# Patient Record
Sex: FEMALE | Race: BLACK OR AFRICAN AMERICAN | NOT HISPANIC OR LATINO | URBAN - METROPOLITAN AREA
[De-identification: names, ages, dates, MRNs, and addresses within clinical notes are randomized per-mention and may not be internally consistent; named-entity substitution may affect disease eponyms.]

---

## 2022-06-10 ENCOUNTER — EMERGENCY (EMERGENCY)
Facility: HOSPITAL | Age: 57
LOS: 1 days | Discharge: HOME | End: 2022-06-10
Admitting: EMERGENCY MEDICINE
Payer: MEDICAID

## 2022-06-10 VITALS
TEMPERATURE: 100 F | OXYGEN SATURATION: 100 % | HEART RATE: 114 BPM | DIASTOLIC BLOOD PRESSURE: 80 MMHG | SYSTOLIC BLOOD PRESSURE: 137 MMHG | RESPIRATION RATE: 16 BRPM

## 2022-06-10 LAB
BASE EXCESS BLDV CALC-SCNC: 0 MMOL/L — SIGNIFICANT CHANGE UP (ref -2–3)
BASOPHILS # BLD AUTO: 0.06 K/UL — SIGNIFICANT CHANGE UP (ref 0–0.2)
BASOPHILS NFR BLD AUTO: 0.5 % — SIGNIFICANT CHANGE UP (ref 0–2)
BLOOD GAS VENOUS COMPREHENSIVE RESULT: SIGNIFICANT CHANGE UP
CHLORIDE BLDV-SCNC: 102 MMOL/L — SIGNIFICANT CHANGE UP (ref 96–108)
CO2 BLDV-SCNC: 24.9 MMOL/L — SIGNIFICANT CHANGE UP (ref 22–26)
EOSINOPHIL # BLD AUTO: 0.04 K/UL — SIGNIFICANT CHANGE UP (ref 0–0.5)
EOSINOPHIL NFR BLD AUTO: 0.3 % — SIGNIFICANT CHANGE UP (ref 0–6)
GAS PNL BLDV: 135 MMOL/L — LOW (ref 136–145)
GAS PNL BLDV: SIGNIFICANT CHANGE UP
GLUCOSE BLDV-MCNC: 160 MG/DL — HIGH (ref 70–99)
HCO3 BLDV-SCNC: 24 MMOL/L — SIGNIFICANT CHANGE UP (ref 22–29)
HCT VFR BLD CALC: 34.5 % — SIGNIFICANT CHANGE UP (ref 34.5–45)
HCT VFR BLDA CALC: 34 % — LOW (ref 34.5–46.5)
HGB BLD CALC-MCNC: 11.3 G/DL — LOW (ref 11.5–15.5)
HGB BLD-MCNC: 11.3 G/DL — LOW (ref 11.5–15.5)
IANC: 9.36 K/UL — HIGH (ref 1.8–7.4)
IMM GRANULOCYTES NFR BLD AUTO: 0.2 % — SIGNIFICANT CHANGE UP (ref 0–1.5)
LACTATE BLDV-MCNC: 1 MMOL/L — SIGNIFICANT CHANGE UP (ref 0.5–2)
LYMPHOCYTES # BLD AUTO: 1.47 K/UL — SIGNIFICANT CHANGE UP (ref 1–3.3)
LYMPHOCYTES # BLD AUTO: 11.9 % — LOW (ref 13–44)
MCHC RBC-ENTMCNC: 29.7 PG — SIGNIFICANT CHANGE UP (ref 27–34)
MCHC RBC-ENTMCNC: 32.8 GM/DL — SIGNIFICANT CHANGE UP (ref 32–36)
MCV RBC AUTO: 90.8 FL — SIGNIFICANT CHANGE UP (ref 80–100)
MONOCYTES # BLD AUTO: 1.41 K/UL — HIGH (ref 0–0.9)
MONOCYTES NFR BLD AUTO: 11.4 % — SIGNIFICANT CHANGE UP (ref 2–14)
NEUTROPHILS # BLD AUTO: 9.36 K/UL — HIGH (ref 1.8–7.4)
NEUTROPHILS NFR BLD AUTO: 75.7 % — SIGNIFICANT CHANGE UP (ref 43–77)
NRBC # BLD: 0 /100 WBCS — SIGNIFICANT CHANGE UP
NRBC # FLD: 0 K/UL — SIGNIFICANT CHANGE UP
PCO2 BLDV: 35 MMHG — LOW (ref 39–42)
PH BLDV: 7.44 — HIGH (ref 7.32–7.43)
PLATELET # BLD AUTO: 336 K/UL — SIGNIFICANT CHANGE UP (ref 150–400)
PO2 BLDV: 53 MMHG — SIGNIFICANT CHANGE UP
POTASSIUM BLDV-SCNC: 3 MMOL/L — LOW (ref 3.5–5.1)
RBC # BLD: 3.8 M/UL — SIGNIFICANT CHANGE UP (ref 3.8–5.2)
RBC # FLD: 12.6 % — SIGNIFICANT CHANGE UP (ref 10.3–14.5)
SAO2 % BLDV: 84.2 % — SIGNIFICANT CHANGE UP
WBC # BLD: 12.37 K/UL — HIGH (ref 3.8–10.5)
WBC # FLD AUTO: 12.37 K/UL — HIGH (ref 3.8–10.5)

## 2022-06-10 PROCEDURE — 99285 EMERGENCY DEPT VISIT HI MDM: CPT

## 2022-06-10 RX ORDER — SODIUM CHLORIDE 9 MG/ML
1000 INJECTION INTRAMUSCULAR; INTRAVENOUS; SUBCUTANEOUS ONCE
Refills: 0 | Status: COMPLETED | OUTPATIENT
Start: 2022-06-10 | End: 2022-06-10

## 2022-06-10 RX ORDER — KETOROLAC TROMETHAMINE 30 MG/ML
30 SYRINGE (ML) INJECTION ONCE
Refills: 0 | Status: DISCONTINUED | OUTPATIENT
Start: 2022-06-10 | End: 2022-06-10

## 2022-06-10 RX ADMIN — SODIUM CHLORIDE 1000 MILLILITER(S): 9 INJECTION INTRAMUSCULAR; INTRAVENOUS; SUBCUTANEOUS at 23:37

## 2022-06-10 RX ADMIN — Medication 30 MILLIGRAM(S): at 23:12

## 2022-06-10 NOTE — ED PROVIDER NOTE - OBJECTIVE STATEMENT
HPI- Patient is a 57 y.o female with no known PMHx who presents to ED c/o abdominal pain x 3 days. Pt states that for past few days she has had lower abdominal pain radiating into pelvis that feels like a heavy pressure. Also notes abdominal distention. Today she began to have vaginal bleeding but only notices it when she urinates. Also notes some constipation. Only passed a little bit of stool today. No melena/bloody stools. Denies hx of Uterine fibroids or ovarian cysts. Denies fevers, chills, nausea, vomiting, dysuria, diarrhea, cp, sob, or any other complaints. Pt states she is perimenopausal. Unsure of exact LMP but was 3 months ago.

## 2022-06-10 NOTE — ED PROVIDER NOTE - PHYSICAL EXAMINATION
Vital signs reviewed.   CONSTITUTIONAL: Well-appearing; well-nourished; in no apparent distress. Non-toxic appearing.   HEAD: Normocephalic, atraumatic.  EYES: conjunctiva and sclera WNL.  CARD: Normal S1, S2; no murmurs, rubs, or gallops noted.  RESP: Normal chest excursion with respiration; breath sounds clear and equal bilaterally; no wheezes, rhonchi, or rales.  ABD/GI: +distended abdomen, +palpable likely fibrous uterus. NCVAT b/l. + suprapubic ttp. Pelvic exam done with GAUTAM Agustin- + blood in vault. Unable to visualize cervix. No CMT. No masses or lesions noted. No adnexal tenderness.   EXT/MS: moves all extremities  SKIN: Normal for age and race  NEURO: Awake, alert, oriented x 3, no gross deficits.  PSYCH: Normal mood; appropriate affect.

## 2022-06-10 NOTE — ED ADULT TRIAGE NOTE - CHIEF COMPLAINT QUOTE
Pt. c/o lower abdominal pain x3 days. Endorses hematuria and constipation. Denies nausea, vomiting, diarrhea, or back pain. Denies past medical history.

## 2022-06-10 NOTE — ED PROVIDER NOTE - PATIENT PORTAL LINK FT
You can access the FollowMyHealth Patient Portal offered by [facility] by registering at the following website: [email or web address]. By joining [facility]’s FollowMyHealth portal, you will also be able to view your health information using other applications (apps) compatible with our system.

## 2022-06-10 NOTE — ED PROVIDER NOTE - PROGRESS NOTE DETAILS
PA Martínez- Pt CT a/p and US reviewed- ?dilated appendix and also very large and multiple fibrous lesions. Surgery and OBGYN consulted. Surgery saw patient-state imaging does not appear concerning for appendicitis. No surgical intervention and agree with OBGYN consult. GYN to see patient. Patient feeling well at this time and sx improved. Will monitor and reassess. NATALEE Martínez- Pt seen by GYN team. No inpatient intervention at this time but they are concerned regarding fibroids and rapid increase in size along with vaginal bleeding given patients age and so feel patient needs quick f/u with GynOnc. # for GynOnc was given by GYN resident to patient and I will include it on DC papers. Results of CT were discussed again with patient along with need for f/u with Gyn Onc to r/o possible malignancy and also urology given CT showing signs of mild/mod hydro. Pt able to urinate in ED w/o difficulty. Pt notes abd pain resolved s/p toradol.  Will f/u urine culture. Vitals are stable. Pt is stable for DC at this point and will follow up with appropriate specialist. Strict return instructions were given. All questions and concerns addressed. No complaints noted. PA Martínez- Pt seen by GYN team. No inpatient intervention at this time but they are concerned regarding fibroids and rapid increase in size along with vaginal bleeding given patients age and so feel patient needs quick f/u with GynOnc. # for GynOnc was given by GYN resident to patient and I will include it on DC papers. Results of CT were discussed again with patient along with need for f/u with Gyn Onc to r/o possible malignancy and also urology given CT showing signs of mild/mod hydro. Pt able to urinate in ED w/o difficulty but will give patient strict return precautions for signs/symptoms of retention (Reviewed imaging/case with Urology PA).  Pt notes abd pain resolved s/p toradol.  Will f/u urine culture. Vitals are stable. Pt is stable for DC at this point and will follow up with appropriate specialist. Strict return instructions were given. All questions and concerns addressed. No complaints noted.

## 2022-06-10 NOTE — ED PROVIDER NOTE - CLINICAL SUMMARY MEDICAL DECISION MAKING FREE TEXT BOX
Patient is a 57 y.o female with no known PMHx who presents to ED c/o abdominal pain x 3 days.     DDx- Pt perimenopausal so could be menses w/ fibroid vs abd mass. Plan for CT a/p r/o bowel obstruction. Will get TVUS to assess vaginal bleeding. Will get labs and ua and give toradol and reassess.

## 2022-06-10 NOTE — ED PROVIDER NOTE - NSFOLLOWUPINSTRUCTIONS_ED_ALL_ED_FT
Patient advised to follow up with GYNECOLOGY/ONCOLOGY and UROLOGY  and told to return to the emergency department immediately for any new or concerning symptoms such as worsening pain, difficulty urinating, fevers, chills, nausea, vomiting, diarrhea, OR ANY OTHER COMPLAINTS. Patient agrees with plan.        Rest, stay hydrated   Can take ibuprofen 600 mg every 6 hours as needed for pain   Follow up with Gyn-oncology # [phone]   Also follow up with urology clinic (call [phone] to make an appointment)        Follow up with your primary care physician in 48-72 hours- bring copies of your results.  Return to the ER for worsening or persistent symptoms, and/or ANY NEW OR CONCERNING SYMPTOMS. If you have issues obtaining follow up, please call: [phone] to obtain a doctor or specialist who takes your insurance in your area.  You may call [phone] to make an appointment with the internal medicine clinic.

## 2022-06-10 NOTE — ED ADULT NURSE NOTE - OBJECTIVE STATEMENT
pt received to intake  , a&ox4 , ambulatory ,  p/w lower abd pain x 3 days and complain of hematuria constipation. Pt breathing even and unlabored on room air. Denies issues urinating , fever, chills, cough, SOB, chest pain, palpitations, dizziness, N/V/D, constipation, numbness, tingling. IV placed. Labs collected and sent. pending US and CT . pt educated on fall precautions and confirms understanding via teach back method. Stretcher locked in lowest position with siderails up x2. Call bell and personal items within reach.

## 2022-06-11 VITALS
TEMPERATURE: 99 F | SYSTOLIC BLOOD PRESSURE: 120 MMHG | HEART RATE: 89 BPM | DIASTOLIC BLOOD PRESSURE: 63 MMHG | RESPIRATION RATE: 15 BRPM | OXYGEN SATURATION: 97 %

## 2022-06-11 LAB
ALBUMIN SERPL ELPH-MCNC: 4 G/DL — SIGNIFICANT CHANGE UP (ref 3.3–5)
ALP SERPL-CCNC: 82 U/L — SIGNIFICANT CHANGE UP (ref 40–120)
ALT FLD-CCNC: 27 U/L — SIGNIFICANT CHANGE UP (ref 4–33)
ANION GAP SERPL CALC-SCNC: 14 MMOL/L — SIGNIFICANT CHANGE UP (ref 7–14)
APPEARANCE UR: CLEAR — SIGNIFICANT CHANGE UP
AST SERPL-CCNC: 25 U/L — SIGNIFICANT CHANGE UP (ref 4–32)
BACTERIA # UR AUTO: NEGATIVE — SIGNIFICANT CHANGE UP
BILIRUB SERPL-MCNC: 0.7 MG/DL — SIGNIFICANT CHANGE UP (ref 0.2–1.2)
BILIRUB UR-MCNC: NEGATIVE — SIGNIFICANT CHANGE UP
BUN SERPL-MCNC: 10 MG/DL — SIGNIFICANT CHANGE UP (ref 7–23)
CALCIUM SERPL-MCNC: 8.8 MG/DL — SIGNIFICANT CHANGE UP (ref 8.4–10.5)
CHLORIDE SERPL-SCNC: 98 MMOL/L — SIGNIFICANT CHANGE UP (ref 98–107)
CO2 SERPL-SCNC: 23 MMOL/L — SIGNIFICANT CHANGE UP (ref 22–31)
COLOR SPEC: YELLOW — SIGNIFICANT CHANGE UP
CREAT SERPL-MCNC: 0.78 MG/DL — SIGNIFICANT CHANGE UP (ref 0.5–1.3)
DIFF PNL FLD: ABNORMAL
EGFR: 89 ML/MIN/1.73M2 — SIGNIFICANT CHANGE UP
EPI CELLS # UR: 3 /HPF — SIGNIFICANT CHANGE UP (ref 0–5)
GLUCOSE SERPL-MCNC: 148 MG/DL — HIGH (ref 70–99)
GLUCOSE UR QL: NEGATIVE — SIGNIFICANT CHANGE UP
HYALINE CASTS # UR AUTO: 0 /LPF — SIGNIFICANT CHANGE UP (ref 0–7)
KETONES UR-MCNC: NEGATIVE — SIGNIFICANT CHANGE UP
LEUKOCYTE ESTERASE UR-ACNC: NEGATIVE — SIGNIFICANT CHANGE UP
NITRITE UR-MCNC: NEGATIVE — SIGNIFICANT CHANGE UP
PH UR: 6 — SIGNIFICANT CHANGE UP (ref 5–8)
POTASSIUM SERPL-MCNC: 3.1 MMOL/L — LOW (ref 3.5–5.3)
POTASSIUM SERPL-SCNC: 3.1 MMOL/L — LOW (ref 3.5–5.3)
PROT SERPL-MCNC: 7.6 G/DL — SIGNIFICANT CHANGE UP (ref 6–8.3)
PROT UR-MCNC: ABNORMAL
RBC CASTS # UR COMP ASSIST: 5 /HPF — HIGH (ref 0–4)
SODIUM SERPL-SCNC: 135 MMOL/L — SIGNIFICANT CHANGE UP (ref 135–145)
SP GR SPEC: 1.01 — SIGNIFICANT CHANGE UP (ref 1–1.05)
UROBILINOGEN FLD QL: ABNORMAL
WBC UR QL: 6 /HPF — HIGH (ref 0–5)

## 2022-06-11 PROCEDURE — G1004: CPT

## 2022-06-11 PROCEDURE — 76830 TRANSVAGINAL US NON-OB: CPT | Mod: 26

## 2022-06-11 PROCEDURE — 74177 CT ABD & PELVIS W/CONTRAST: CPT | Mod: 26,MG

## 2022-06-11 RX ORDER — POTASSIUM CHLORIDE 20 MEQ
40 PACKET (EA) ORAL ONCE
Refills: 0 | Status: COMPLETED | OUTPATIENT
Start: 2022-06-11 | End: 2022-06-11

## 2022-06-11 RX ADMIN — Medication 40 MILLIEQUIVALENT(S): at 01:15

## 2022-06-11 NOTE — CONSULT NOTE ADULT - ASSESSMENT
A/P: 57y  LMP 3 months ago presents with suprapubic pain and abdominal bloating. Exam and CT notable for myomatous uterus with no history of fibroids and subacute growth based on patient report (no outside medical records available). Irregular vaginal bleeding and rapid growth of fibroids c/f leiomyosarcoma vs endometrial pathology vs perimenopausal bleeding in pt with fibroid uterus. Pt stable at this time with no heavy bleeding on exam, no s/s of infection and mild anemia on CBC.      - F/u OP with Gyn Oncology. Pt given referral and Oncology emailed for appointment.  - Pt can continue NSAIDs for pain control     Karen Verdin, PGY-2    seen with Dr Thao    A/P: 57y  LMP 3 months ago presents with suprapubic pain and abdominal bloating. Exam and CT notable for myomatous uterus with associated hydronephrosis. No history of fibroids and subacute growth based on patient report (no outside medical records available). Irregular vaginal bleeding and rapid growth of fibroids c/f leiomyosarcoma vs endometrial pathology vs perimenopausal bleeding in pt with fibroid uterus. Pt stable at this time with no heavy bleeding on exam, no s/s of infection and mild anemia on CBC.      - F/u OP with Gyn Oncology. Pt given referral and Oncology emailed for appointment.  - Pt can continue NSAIDs for pain control   - W/up of dilated appendix per surgery     Karen Verdin, PGY-2    seen with Dr Thao

## 2022-06-11 NOTE — CONSULT NOTE ADULT - SUBJECTIVE AND OBJECTIVE BOX
GYN Consult Note    HPI:  57y  LMP 3 months ago presents with suprapubic pain and abdominal bloating. Pt noted severe abdominal bloating and constipation starting 4 days ago. Initially she thought bloating was 2/2 fish louis her son brought over. She then was straining with a BM and started having vaginal bleeding. The bleeding was not soaking pads and mostly noted with wiping. Pt is sure bleeding is vaginal and not rectal. After this, she also began having non-radiating lower abdominal pain. She took Tylenol 2 days ago with no relief. When the pain progressed to the point of limiting her ability to walk at work today, she came to the ED for further evaluation.     In ED, pt initially tachycardic with repeat VSS. She received Toradol with complete resolution of her pain. She still notes vaginal bleeding with no clots. Still endorses a pelvic pressure. She denies unintentional weight loss, decreased appetite, early satiety, N/V, chest pain, SOB, changes in bladder habits, lower extremity pain or swelling. Pt denies having bloating prior and had never appreciated a hard mass in abdomen.       Name of GYN Physician: Dr Farias in [place]    ObHx:  x3    GynHx: Denies fibroids, cysts, endometriosis, STI's, abnormal pap smears. Last pap: 3 years ago. Last mammo: 2 years ago. H/o regular q28d cycles, menarche at age 15. Cycles abruptly stopped 3 months ago with no vaginal bleeding prior to this episode.     PMHx: None  PSHx: None  Meds: None  All: NKDA  FH: No h/o breast, ovarian, colon or uterine cancer  SH:  Denies etoh, cigarette smoking, recreational drugs. Works at [email or web address].      REVIEW OF SYSTEMS  General: denies fevers, chills, tiredness  Skin/Breast: denies breast pain  Respiratory and Thorax: denies shortness of breath, denies cough  Cardiovascular: denies chest pain and denies palpitations  Gastrointestinal: denies nausea/ vomiting	  Genitourinary: denies dysuria, increased urinary frequency, urgency	  Constitutional, Cardiovascular, Respiratory, Gastrointestinal, Genitourinary, Musculoskeletal and Integumentary review of systems are normal except as noted. 	      Vital Signs Last 24 Hrs  T(C): 37.2 (2022 03:53), Max: 37.7 (10 Ilia 2022 21:55)  T(F): 99 (2022 03:53), Max: 99.9 (10 Ilia 2022 21:55)  HR: 89 (2022 03:53) (89 - 114)  BP: 120/63 (2022 03:53) (104/81 - 137/80)  BP(mean): --  RR: 15 (2022 03:53) (15 - 17)  SpO2: 97% (2022 03:53) (97% - 100%)        PHYSICAL EXAM:   Gen: Comfortable, NAD  Psych: appropriate mood & affect  CV: RRR  Pulm: CTAB  Abd: Soft, distended, abdominal mass palpated at umbilicus  Ext: Warm & well perfused. No edema or tenderness bilaterally  Pelvic: Normal external genitalia, urethra, clitoris, and anus. Normal labia bilaterally. Normal pattern of hair growth along pubic area. Normal-appearing skin, no lesion, no masses.  Spec Exam: Unable to visualize cervix 2/2 distortion from enlarged uterus. Serosanguinous discharge noted with pooling in os, though no significant active bleeding noted. Normal vaginal epithelium. No lesions or masses noted along the vaginal epithelium.  Bimanual exam: 20week size uterus, myomatous, nontender, fixed. Unable to appreciated b/l adnexa 2/2 myomatous uterus.    Rectal vaginal: no nodularity on rectovaginal septum, parametria without nodularity.     LABS:                        11.3   12.37 )-----------( 336      ( 10 Ilia 2022 23:13 )             34.5     06-10    135  |  98  |  10  ----------------------------<  148<H>  3.1<L>   |  23  |  0.78    Ca    8.8      10 Ilia 2022 23:13    TPro  7.6  /  Alb  4.0  /  TBili  0.7  /  DBili  x   /  AST  25  /  ALT  27  /  AlkPhos  82  06-10      Urinalysis Basic - ( 2022 01:19 )    Color: Yellow / Appearance: Clear / S.010 / pH: x  Gluc: x / Ketone: Negative  / Bili: Negative / Urobili: 3 mg/dL   Blood: x / Protein: 30 mg/dL / Nitrite: Negative   Leuk Esterase: Negative / RBC: 5 /HPF / WBC 6 /HPF   Sq Epi: x / Non Sq Epi: 3 /HPF / Bacteria: Negative              RADIOLOGY & ADDITIONAL STUDIES:  < from: CT Abdomen and Pelvis w/ IV Cont (22 @ 01:02) >    ACC: [id number] EXAM:  CT ABDOMEN AND PELVIS IC                          PROCEDURE DATE:  2022          INTERPRETATION:  CLINICAL INFORMATION: Abdominal pain. Evaluate for small   bowel obstruction versus mass.    COMPARISON: Same day pelvic US    CONTRAST/COMPLICATIONS:  IV Contrast: Omnipaque 350  63 cc administered   7 cc discarded  Oral Contrast: NONE  Complications: None reported at time of study completion    PROCEDURE:  CT of the Abdomen and Pelvis was performed.  Sagittal and coronal reformats were performed.    FINDINGS:    LOWER CHEST: Within normal limits.    LIVER: Within normal limits.  BILE DUCTS: Normal caliber.  GALLBLADDER: No significant gallbladder wall edema.  SPLEEN: Within normal limits.  PANCREAS: Within normal limits.    ADRENALS: Within normal limits.  KIDNEYS/URETERS: Mild to moderate bilateral hydroureteronephrosis to the   level of the enlarged uterus. A 1.2 x 1.3 cm left renal cysts. Left renal   cyst. No nephrolithiasis.  BLADDER: Partially distended.  REPRODUCTIVE ORGANS: Enlarged uterus with multiple heterogeneously   enhancing and hypodense lesions, for example, 12.5 x 11.5 x 16.2 cm   (transverse x AP x CC). A 2.0 x 2.3 cm hypodense structure at the right   adnexa/uterus (2:72), suggesting a follicle/cyst.    BOWEL: No bowel obstruction. Borderline dilated appendix filled with air   and fluid with minimal surrounding stranding.  PERITONEUM: No free air or organized fluid collection.  VESSELS: Normal caliber of the abdominal aorta.  RETROPERITONEUM/LYMPH NODES: No lymphadenopathy.  ABDOMINAL WALL: Small fat-containing umbilical hernia.  BONES: Degenerative changes of the spine.    IMPRESSION:    No bowel obstruction. Borderline dilated appendix filled with air and   fluid with minimal surrounding stranding. Recommend clinical correlation.    Enlarged uterus with multiple lesions, which are typically due to   fibroids. Leiomyosarcoma cannot be entirely excluded if these lesions are   rapidly enlarging. Recommend comparison to previous outside study and   follow-up.    Mild to moderate bilateral hydroureteronephrosis, likely due to   compression by the enlarged uterus.    Additional findings as described.    --- End of Report ---          DAVID CAZARES MD; Resident Radiologist  Thisdocument has been electronically signed.  DONAVON HARPER MD; Attending Radiologist  This document has been electronically signed. 2022  3:28AM    < end of copied text >

## 2022-06-11 NOTE — ED ADULT NURSE REASSESSMENT NOTE - NS ED NURSE REASSESS COMMENT FT1
Report and pt received from GAUTAM Crook. Pt A&Ox4 resting on stretcher. Respirations even and unlabored. Pt offers no complaints at this time, well appearing. NAD noted. Pending disposition by MD. bed in lowest position, side rails up, safety maintained.

## 2022-06-11 NOTE — CONSULT NOTE ADULT - SUBJECTIVE AND OBJECTIVE BOX
GENERAL SURGERY CONSULT NOTE  Attending: Dr. Alvares   Service: B Team Surgery  Contact: [phone]    HPI  57 y.o female with no known PMHx who presents to ED with 3 days of abdominal pain, pelvic fullness, and new vaginal bleeding. She also notes abdominal distention that has gradually increased over the last few months, but she has not been previously evaluated for this. She denies nausea, vomiting, fever, or chills. She has expereinced constipation over the last 3 days as well with decreased appetite. Only passed a little bit of stool today. No melena/bloody stools. Unsure of exact LMP but was approximately 3 months ago.    In ED, patient is hemodynamically stable and afebrile. CT scan was obtained which demonstrated an enlarged uterus with may enlarged fibroids. Appendix also appears mildly dilated, however is air filled. Surgery called to evaluate for appendicitis.     PMH/PSH  No pertinent past medical history      No significant past surgical history        MEDICATIONS      Allergies    No Known Allergies    Intolerances        Social    Physical Exam  T(C): 37.2 (22 @ 03:53), Max: 37.7 (06-10-22 @ 21:55)  HR: 89 (22 @ 03:53) (89 - 114)  BP: 120/63 (22 @ 03:53) (104/81 - 137/80)  RR: 15 (22 @ 03:53) (15 - 17)  SpO2: 97% (22 @ 03:53) (97% - 100%)  Wt(kg): --  Tmax: T(C): , Max: 37.7 (06-10-22 @ 21:55)  Wt(kg): --      Gen: NAD  Neuro: AAOx3  HEENT: normocephalic, atraumatic, no scleral icterus  CV: S1, S2, RRR  Pulm: CTA B/L  Abd: Distended, palpable fibroid uterus, non-tender, no rebound or guarding  Ext: warm, no edema    LABS                        11.3   12.37 )-----------( 336      ( 10 Ilia 2022 23:13 )             34.5     06-10    135  |  98  |  10  ----------------------------<  148<H>  3.1<L>   |  23  |  0.78    Ca    8.8      10 Ilia 2022 23:13    TPro  7.6  /  Alb  4.0  /  TBili  0.7  /  DBili  x   /  AST  25  /  ALT  27  /  AlkPhos  82  06-10      Urinalysis Basic - ( 2022 01:19 )    Color: Yellow / Appearance: Clear / S.010 / pH: x  Gluc: x / Ketone: Negative  / Bili: Negative / Urobili: 3 mg/dL   Blood: x / Protein: 30 mg/dL / Nitrite: Negative   Leuk Esterase: Negative / RBC: 5 /HPF / WBC 6 /HPF   Sq Epi: x / Non Sq Epi: 3 /HPF / Bacteria: Negative            IMAGING  < from: US Transvaginal (22 @ 01:02) >  ACC: [id number] EXAM:  US TRANSVAGINAL                          PROCEDURE DATE:  2022          INTERPRETATION:  CLINICAL INFORMATION: Pelvic pain    LMP: A few months ago.    COMPARISON: None available.    TECHNIQUE: Endovaginal pelvic sonogramonly.    FINDINGS: This study was technically difficult due to bowel gas and   enlarged, heterogeneous uterus.    Uterus: Enlarged, measuring 19.2 cm x 12.0 cm x 13.3 cm. Heterogeneous   echotexture. Heterogeneous lesions, for example, 14.0 x 10.8 x 2.3 cm,   suggesting fibroids.  Endometrium: Not visualized.  Cervix: Nabothian cysts.  Right ovary: Not visualized.  Left ovary: Not visualized.  Fluid: None.    IMPRESSION:    Suboptimal study due to bowel gas and enlarged, heterogeneous uterus   suggesting fibroids. Recommend comparison to previous outside study and   correlation with follow-up imaging (preferably MR).    --- End of Report ---    < end of copied text >  < from: CT Abdomen and Pelvis w/ IV Cont (22 @ 01:02) >  IMPRESSION:    No bowel obstruction. Borderline dilated appendix filled with air and   fluid with minimal surrounding stranding. Recommend clinical correlation.    Enlarged uterus with multiple lesions, which are typically due to   fibroids. Leiomyosarcoma cannot be entirely excluded if these lesions are   rapidly enlarging. Recommend comparison to previous outside study and   follow-up.    Mild to moderate bilateral hydroureteronephrosis, likely due to   compression by the enlarged uterus.    Additional findings as described.    --- End of Report ---    < end of copied text >

## 2022-06-11 NOTE — CONSULT NOTE ADULT - ASSESSMENT
56 y/o female with abdominal pain and new diagnosis of enlarged fibroid uterus. Given history and clinical examination suspicion for appendicitis is low.     -Patient's complaints of abdominal pain and fullness and associated vaginal bleeding likely related to enlarged fibrotic uterus. History and clinical exam in the absence of RLQ pain is not consistent with appendicitis.  -Recommend OB/GYN consultation for uterine fibroids  -Will observe off antibiotics. If clinical exam changes or has new RLQ pain will reassess.   -Advance diet as tolerated  -No surgical intervention at this time  -Discussed with Dr. Alvares

## 2022-06-13 LAB
CULTURE RESULTS: SIGNIFICANT CHANGE UP
SPECIMEN SOURCE: SIGNIFICANT CHANGE UP

## 2022-06-17 NOTE — CHART NOTE - NSCHARTNOTEFT_GEN_A_CORE
R4 Note    Called patient to confirm she has outpatient follow-up. Unable to follow-up at [facility] due to Insurance reasons.  Has appointment today at 11:30am with Dr. Rajeev Morejon followed by Urology follow-up on Tuesday.    JUAN العراقي, PGY-4  d/w Dr. Iniguez